# Patient Record
Sex: FEMALE | Race: OTHER | URBAN - METROPOLITAN AREA
[De-identification: names, ages, dates, MRNs, and addresses within clinical notes are randomized per-mention and may not be internally consistent; named-entity substitution may affect disease eponyms.]

---

## 2022-04-28 ENCOUNTER — COMPREHENSIVE EXAM (OUTPATIENT)
Dept: URBAN - METROPOLITAN AREA CLINIC 23 | Facility: CLINIC | Age: 82
End: 2022-04-28

## 2022-04-28 DIAGNOSIS — H25.13: ICD-10-CM

## 2022-04-28 DIAGNOSIS — H25.013: ICD-10-CM

## 2022-04-28 DIAGNOSIS — H52.03: ICD-10-CM

## 2022-04-28 DIAGNOSIS — H52.4: ICD-10-CM

## 2022-04-28 PROCEDURE — 92015 DETERMINE REFRACTIVE STATE: CPT

## 2022-04-28 PROCEDURE — 92310-1E ESTABLISHED CL PATIENT SPHERICAL SINGLE VISION SOFT LENS EVALUATION

## 2022-04-28 PROCEDURE — 99204 OFFICE O/P NEW MOD 45 MIN: CPT

## 2022-04-28 ASSESSMENT — TONOMETRY
OD_IOP_MMHG: 10
OS_IOP_MMHG: 10

## 2022-04-28 ASSESSMENT — VISUAL ACUITY
OD_SC: 20/250
OU_SC: 20/200
OU_CC: 20/40-
OS_SC: 20/250
OU_SC: 20/400

## 2022-04-28 NOTE — PATIENT DISCUSSION
Spherical CL fitting performed today. Changes made to old/habitual mota and new CLRx finalized today in daily disposable lens option. Due to impending cat changes, recommend 12 month expiration.